# Patient Record
Sex: MALE | Race: OTHER | URBAN - METROPOLITAN AREA
[De-identification: names, ages, dates, MRNs, and addresses within clinical notes are randomized per-mention and may not be internally consistent; named-entity substitution may affect disease eponyms.]

---

## 2019-08-06 ENCOUNTER — OFFICE VISIT (OUTPATIENT)
Dept: FAMILY MEDICINE CLINIC | Age: 34
End: 2019-08-06

## 2019-08-06 VITALS
BODY MASS INDEX: 27.46 KG/M2 | TEMPERATURE: 97.9 F | DIASTOLIC BLOOD PRESSURE: 70 MMHG | SYSTOLIC BLOOD PRESSURE: 141 MMHG | HEART RATE: 56 BPM | WEIGHT: 149.2 LBS | HEIGHT: 62 IN

## 2019-08-06 DIAGNOSIS — L81.9 HYPERPIGMENTATION: Primary | ICD-10-CM

## 2019-08-06 DIAGNOSIS — Z13.9 ENCOUNTER FOR SCREENING: ICD-10-CM

## 2019-08-06 LAB
GLUCOSE POC: NORMAL MG/DL
HGB BLD-MCNC: 15.4 G/DL

## 2019-08-06 NOTE — PROGRESS NOTES
Printed AVS, provided to patient and reviewed. Pt advised to buy sun block 50 to 100 on all exposed skin. Explained that he needed to apply it on all areas of his body that are not covered by clothing. 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 All questions answered and pt verbalized understanding. Ivan Tyson interpreted for this encounter.  Samira Nevarez RN

## 2019-08-06 NOTE — PROGRESS NOTES
Results for orders placed or performed in visit on 08/06/19   AMB POC GLUCOSE BLOOD, BY GLUCOSE MONITORING DEVICE   Result Value Ref Range    Glucose POC  mg/dL   AMB POC HEMOGLOBIN (HGB)   Result Value Ref Range    Hemoglobin (POC) 15.4

## 2019-08-06 NOTE — PROGRESS NOTES
Assessment/Plan:       ICD-10-CM ICD-9-CM    1. Hyperpigmentation L81.9 709.00    2. Encounter for screening Z13.9 V82.9 AMB POC GLUCOSE BLOOD, BY GLUCOSE MONITORING DEVICE      AMB POC HEMOGLOBIN (HGB)         [address], [address] [facility] [address] [facility] Zohaib Stewart  [address] [facility] [address] [place] [address] [place] [address]  Phone: [phone] Fax: [phone]      [address]-  [address]   Subjective:     Chief Complaint   Patient presents with    Other     Black spots all over body    Omar Solis is a 35 y.o. OTHER male. HPI:   For how long? Started 20 days ago. Some itching. He works outside cutting trees. Has tried: Latricia cream on the face. 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 This problem started 20 days ago. First started on his left lower hip. Review of Systems: Negative for: fever, chest pain, shortness of breath, leg swelling, exertional dyspnea, palpitations. Current Medications:   No current outpatient medications on file prior to visit. No current facility-administered medications on file prior to visit. Social History: He  reports that he has never smoked. He has never used smokeless tobacco. He reports that he drank alcohol. He reports that he has current or past drug history. Objective:     Vitals:    08/06/19 1122   BP: 141/70   Pulse: (!) 56   Temp: 97.9 °F (36.6 °C)   TempSrc: Oral   Weight: 149 lb 3.2 oz (67.7 kg)   Height: 5' 2.21\" (1.58 m)    No LMP for male patient. Wt Readings from Last 2 Encounters:   08/06/19 149 lb 3.2 oz (67.7 kg)     Results for orders placed or performed in visit on 08/06/19   AMB POC GLUCOSE BLOOD, BY GLUCOSE MONITORING DEVICE   Result Value Ref Range    Glucose POC  mg/dL   AMB POC HEMOGLOBIN (HGB)   Result Value Ref Range    Hemoglobin (POC) 15.4       Physical Examination:  General appearance - well developed, no acute distress. Integumentary - area of hyperpigmentation left lower hip, flat.   Smaller areas of hyperpigmentation on his arms primarily and also on his lower extremities that are also flat, uniformly hyperpigmented, and 4 mm in diameter or smaller. Chest - clear to auscultation. Heart - regular rate and rhythm without murmurs, rubs, or gallops. Abdomen - bowel sounds present x 4, NT, ND. Extremities - pulses intact. No peripheral edema. Assessment/Plan:   Diagnoses and all orders for this visit:    1. Hyperpigmentation    2. Encounter for screening  -     AMB POC GLUCOSE BLOOD, BY GLUCOSE MONITORING DEVICE  -     AMB POC HEMOGLOBIN (HGB)      Use sunblock. Glenn Beard DNP, FNP-BC, BC-ADM  Eliazar Boss expressed understanding of this plan.